# Patient Record
Sex: FEMALE | Race: OTHER | Employment: FULL TIME | URBAN - METROPOLITAN AREA
[De-identification: names, ages, dates, MRNs, and addresses within clinical notes are randomized per-mention and may not be internally consistent; named-entity substitution may affect disease eponyms.]

---

## 2021-06-09 ENCOUNTER — HOSPITAL ENCOUNTER (OUTPATIENT)
Dept: PHYSICAL THERAPY | Age: 46
Discharge: HOME | End: 2021-06-09
Payer: OTHER GOVERNMENT

## 2021-06-09 PROCEDURE — 97110 THERAPEUTIC EXERCISES: CPT | Performed by: PHYSICAL THERAPIST

## 2021-06-09 PROCEDURE — 97014 ELECTRIC STIMULATION THERAPY: CPT | Performed by: PHYSICAL THERAPIST

## 2021-06-09 PROCEDURE — 97162 PT EVAL MOD COMPLEX 30 MIN: CPT | Performed by: PHYSICAL THERAPIST

## 2021-06-09 NOTE — PROGRESS NOTES
[address] [facility] PHYSICAL THERAPY AT [address] [place] [address]  [address], [address] ,[address], [address]  Phone: [phone]  Fax: [phone] / STATEMENT OF MEDICAL NECESSITY FOR PHYSICAL THERAPY SERVICES  Patient Name: Aminah Kyle : 1975   Medical   Diagnosis: Lower back pain [M54.5] Treatment Diagnosis: LBP   Onset Date: Chronic     Referral Source: Ana Alvarez NP Start of Care [facility] [place]): 2021   Prior Hospitalization: See medical history Provider #: [id number]   Prior Level of Function: Pt has had several year h/o limited sitting tolerance and inability to stand still, working as a  at [employer]   Comorbidities: 20lb weight gain over past year due to inactivity from LBP   Medications: Verified on Patient Summary List   The Plan of Care and following information is based on the information from the initial evaluation.   ===========================================================================================  Assessment / key information:  53 y/o female presents to PT with c/o centralized lower back pain for the past several years. She notes symptoms are worse with prolonged sitting and standing (especially standing still). Pt notes that symptoms have worsened over the past few months, and pain has extended into her posterior buttock/upper thoracic area. She had multiple radiographs taken which revealed an anterolisthesis at L5. Examination showed lumbar AROM was painfully limited into extension and rotation R to 75% of normal.  She had good strength in LEs, but did have some LBP with resisted R hip flexion. Palpation was inconclusive due to increased reactivity/guarding by the pt.   Ms Lulu Goode has signs and symptoms suggestive of lumbar spondylolisthesis.     ===========================================================================================  Eval Complexity: History MEDIUM  Complexity : 1-2 comorbidities / personal factors will impact the outcome/ POC ;  Examination  MEDIUM Complexity : 3 Standardized tests and measures addressing body structure, function, activity limitation and / or participation in recreation ; Presentation MEDIUM Complexity : Evolving with changing characteristics ; Decision Making MEDIUM Complexity : FOTO score of 26-74; Overall Complexity MEDIUM  Problem List: pain affecting function, decrease ROM, decrease strength, impaired gait/ balance, decrease ADL/ functional abilitiies, decrease activity tolerance, decrease flexibility/ joint mobility and decrease transfer abilities   Treatment Plan may include any combination of the following: Therapeutic exercise, Therapeutic activities, Neuromuscular re-education, Physical agent/modality, Gait/balance training, Manual therapy, Dry Needling, Aquatic therapy, Patient education, Self Care training, Functional mobility training, Home safety training and Stair training  Patient / Family readiness to learn indicated by: asking questions, trying to perform skills and interest  Persons(s) to be included in education: patient (P)  Barriers to Learning/Limitations: None  Measures taken:    Patient Goal (s): \"relieve some of the pain\"   Patient self reported health status: fair  Rehabilitation Potential: good   Short Term Goals: To be accomplished in  2  weeks:  1. Pt independent with basic HEP for flexion based lumbar flexibility and strengthening. 2. Pt able to adapt sitting posture to allow her to sit for up to 30 minutes without pain or soreness when rising.  Long Term Goals: To be accomplished in  6  weeks:  1. Pt to increase FOTO score from 67 to >/= 73  2. Pt independent with high level HEP for lumbar stabilization, body mechnaics, and hip flexibility. 3. Pt to report ability to drive in car for up to 45 minutes without any back pain. 4. Pt pamella to resume walking with exercise without any pain or residual soreness.   Frequency / Duration:   Patient to be seen 2  times per week for 15  sessions:  Patient / Caregiver education and instruction: self care, activity modification, brace/ splint application and exercises    Therapist Signature: Lindsey Stein PT Date: 5/4/7268   Certification Period: na Time: 8:30 AM   ===========================================================================================  I certify that the above Physical Therapy Services are being furnished while the patient is under my care. I agree with the treatment plan and certify that this therapy is necessary. Physician Signature:        Date:       Time:  _     Doris Ball NP  Please sign and return to [facility] [place] or you may fax the signed copy to [phone]. Thank you.

## 2021-06-09 NOTE — PROGRESS NOTES
PHYSICAL THERAPY - DAILY TREATMENT NOTE    Patient Name: Severiano Copper        Date: 2021  : 1975   YES Patient  Verified  Visit #:   1   of   15  Insurance: Payor: JANINE / Plan: Lev Tyler 73 / Product Type:  /      In time: 4:50 Out time: 5:55   Total Treatment Time: 65     BCBS/Medicare Time Tracking (below)   Total Timed Codes (min):  na 1:1 Treatment Time:  na     TREATMENT AREA =  Lower back pain [M54.5]    SUBJECTIVE  Pain Level (on 0 to 10 scale):  6  / 10   Medication Changes/New allergies or changes in medical history, any new surgeries or procedures?     NO    If yes, update Summary List   Subjective Functional Status/Changes:  [x]  No changes reported     See eval/POC           Modalities Rationale:     decrease pain and increase tissue extensibility to improve patient's ability to prevent soreness  15 min [x] Estim, type/location: IFC to lower back - supine after sesison                                     []  att     [x]  unatt     []  w/US     []  w/ice    [x]  w/heat    min []  Mechanical Traction: type/lbs                   []  pro   []  sup   []  int   []  cont    []  before manual    []  after manual    min []  Ultrasound, settings/location:      min []  Iontophoresis w/ dexamethasone, location:                                               []  take home patch       []  in clinic    min []  Ice     []  Heat    location/position:     min []  Vasopneumatic Device, press/temp:     min []  Other:    [x] Skin assessment post-treatment (if applicable):    [x]  intact    []  redness- no adverse reaction     []redness  adverse reaction:        15 min Therapeutic Exercise:  [x]  See flow sheet   Rationale:      increase ROM, increase strength and improve coordination to improve the patients ability to increase activity tolerance       5 min Therapeutic Activity: [x]  See flow sheet   Rationale:    increase ROM, increase strength and improve coordination to improve the patients ability to increase positional tolerance      Billed With/As:   [] TE   [] TA   [] Neuro   [] Self Care Patient Education: [x] Review HEP    [] Progressed/Changed HEP based on:   [] positioning   [] body mechanics   [] transfers   [] heat/ice application    [] other:      Other Objective/Functional Measures:    FOTO - 67     Post Treatment Pain Level (on 0 to 10) scale:   4  / 10     ASSESSMENT  Assessment/Changes in Function:     Pt has signs and symptoms suggestive of subacute pain related to underlying spondylolisthesis. []  See Progress Note/Recertification   Patient will continue to benefit from skilled PT services to modify and progress therapeutic interventions, address functional mobility deficits, address ROM deficits, address strength deficits, analyze and address soft tissue restrictions, analyze and cue movement patterns, analyze and modify body mechanics/ergonomics, and assess and modify postural abnormalities to attain remaining goals.    Progress toward goals / Updated goals:    Goals established today     PLAN  [x]  Upgrade activities as tolerated YES Continue plan of care   []  Discharge due to :    []  Other:      Therapist: Nazario Barajas PT    Date: 6/11/2021 Time: 8:30 AM     Future Appointments   Date Time Provider Ashish Vásquez   6/15/2021  4:15 PM Cheko Vallejo, PT [facility]   6/22/2021 10:30 AM Precious Macario, PT [facility]   6/23/2021 10:30 AM Precious Macario, PT [facility]   6/24/2021  7:30 AM [id number]   6/29/2021  9:30 AM Uldigna Vallejo, PT [facility]   7/1/2021  5:00 PM Uldigna Vallejo, PT [facility]   7/6/2021  9:45 AM Precious Macario, PT [facility] [place] [facility]   7/8/2021  8:45 AM Cheko Vallejo, PT [facility]   7/12/2021  9:45 AM Precious Macario, PT [facility]   7/14/2021  9:00 AM Nhan Silva, PT [facility]

## 2021-06-15 ENCOUNTER — HOSPITAL ENCOUNTER (OUTPATIENT)
Dept: PHYSICAL THERAPY | Age: 46
Discharge: HOME | End: 2021-06-15
Payer: OTHER GOVERNMENT

## 2021-06-15 PROCEDURE — 97014 ELECTRIC STIMULATION THERAPY: CPT

## 2021-06-15 PROCEDURE — 97110 THERAPEUTIC EXERCISES: CPT

## 2021-06-15 PROCEDURE — 97112 NEUROMUSCULAR REEDUCATION: CPT

## 2021-06-15 NOTE — PROGRESS NOTES
[facility] at [address] [facility] [address] [facility], [address] [place] [address]  Phone [phone]   Fax: [phone]    Physical Therapy Home E-STIM/NMES Unit Recommendation      Patient's name Brisa Shepherd   Referring Physician: JULIETA Cruz NP   Treatment Diagnosis: LBP    YOB: 1975   MRN: [id number]     Onset Date:chronic  Date of Service:6/15/2021    Total Treatments: 2       Patient may benefit from E-STIM/NMES home unit due to pain reduction/benefits with use of E-STIM with physical therapy treatments. If you are in agreement, please sign below. Thank you for this referral.        Thank you,  Therapist: Antonino Louise, PT  Date: 6/15/2021  Time:4:29 PM  _______________________________________________________________________    I certify that the above Therapy Services are being furnished while the patient is under my care. I agree with the treatment plan and certify that this therapy is necessary. Y or N I have read the above and request that my patient continue as recommended. Y or N I have read the above report and request that my patient continue therapy with the following changes/special instructions:______________________________________________________________  Y or N I have read the above report and do not wish to have a home ESTIM unit provided    Physician's Signature:____________________  Date:________________    Please sign and return to [facility] at [address] [facility] [address] [facility], [address] [place] [address] Road  Or fax to [phone].

## 2021-06-15 NOTE — PROGRESS NOTES
PHYSICAL THERAPY - DAILY TREATMENT NOTE    Patient Name: Jake Tomlinson        Date: 6/15/2021  : 1975   YES Patient  Verified  Visit #:   2   of   15  Insurance: Payor: JANINE / Plan: Lev Tyler 73 / Product Type:  /      In time: 4:20 P Out time: 5:20 P   Total Treatment Time: 55     BCBS/Medicare Time Tracking (below)   Total Timed Codes (min):  NA 1:1 Treatment Time:  NA     TREATMENT AREA = Lower back pain [M54.5]    SUBJECTIVE  Pain Level (on 0 to 10 scale):  7  / 10   Medication Changes/New allergies or changes in medical history, any new surgeries or procedures? NO    If yes, update Summary List   Subjective Functional Status/Changes:  []  No changes reported     Patient reports she had a fall this morning, she missed the bottom step & fell down onto her buttocks so she is having more pain & burning into her lower back today. She will have MRI of back next week & f/u with MD to be scheduled soon after.            Modalities Rationale:     decrease pain to improve patient's ability to return to pain-free ADLs  15 min [x] Estim, type/location: IFC to l/s in supine                                      []  att     []  unatt     []  w/US     []  w/ice    []  w/heat    min []  Mechanical Traction: type/lbs                   []  pro   []  sup   []  int   []  cont    []  before manual    []  after manual    min []  Ultrasound, settings/location:      min []  Iontophoresis w/ dexamethasone, location:                                               []  take home patch       []  in clinic    min []  Ice     []  Heat    location/position:     min []  Vasopneumatic Device, press/temp:    If using vaso (only need to measure limb vaso being performed on)      pre-treatment girth :       post-treatment girth :       measured at (landmark location) :      min []  Other:    [] Skin assessment post-treatment (if applicable):    [x]  intact    [x]  redness- no adverse reaction []redness  adverse reaction:        15 min Therapeutic Exercise:  [x]  See flow sheet   Rationale:      increase ROM and increase strength to improve the patients ability to return to pain-free standing     25 min Neuromuscular Re-ed: [x]  See flow sheet   Rationale:    increase ROM, increase strength, improve coordination and increase proprioception to improve the patients ability to return to pain-free bed mobility       Billed With/As:   [] TE   [] TA   [] Neuro   [] Self Care Patient Education: [x] Review HEP    [] Progressed/Changed HEP based on:   [] positioning   [] body mechanics   [] transfers   [] heat/ice application    [] other:      Other Objective/Functional Measures:    Initiated exercise per flow sheet (see updated HEP)     Post Treatment Pain Level (on 0 to 10) scale:   5  / 10     ASSESSMENT  Assessment/Changes in Function:     Good tolerance to initial program, slight increased c/o pain L>R hip flexor stretch     []  See Progress Note/Recertification   Patient will continue to benefit from skilled PT services to modify and progress therapeutic interventions, address functional mobility deficits, address ROM deficits, address strength deficits, analyze and modify body mechanics/ergonomics, assess and modify postural abnormalities, address imbalance/dizziness and instruct in home and community integration to attain remaining goals.    Progress toward goals / Updated goals:    Progressing towards goals established at [address] [facility] [id number]  []  Upgrade activities as tolerated YES Continue plan of care   []  Discharge due to :    []  Other:      Therapist: Sorin Haddad PT    Date: 6/15/2021 Time: 4:27 PM     Future Appointments   Date Time Provider Ashish Vásquez   6/22/2021 10:30 AM [facility]ta Servin PT [facility] [place] [facility]   6/23/2021 10:30 AM Roberta Servin PT [facility]   6/24/2021  7:30 [facility]   6/29/2021  9:30 AM [facility]   7/1/2021  5:00 PM [facility] [place] [facility] [place] [facility]   7/6/2021  9:45 AM Skyler Abdi, PT [facility]   7/8/2021  8:45 AM Misty Reina, PT [facility]   7/12/2021  9:45 AM Skyler Abdi, PT [facility]   7/14/2021  9:00 AM Gato Silva, PT [facility]

## 2021-06-23 ENCOUNTER — HOSPITAL ENCOUNTER (OUTPATIENT)
Dept: PHYSICAL THERAPY | Age: 46
Discharge: HOME | End: 2021-06-23
Payer: OTHER GOVERNMENT

## 2021-06-23 PROCEDURE — 97014 ELECTRIC STIMULATION THERAPY: CPT | Performed by: PHYSICAL THERAPIST

## 2021-06-23 PROCEDURE — 97110 THERAPEUTIC EXERCISES: CPT | Performed by: PHYSICAL THERAPIST

## 2021-06-29 ENCOUNTER — APPOINTMENT (OUTPATIENT)
Dept: PHYSICAL THERAPY | Age: 46
End: 2021-06-29
Payer: OTHER GOVERNMENT

## 2021-07-01 ENCOUNTER — HOSPITAL ENCOUNTER (OUTPATIENT)
Dept: PHYSICAL THERAPY | Age: 46
Discharge: HOME | End: 2021-07-01
Payer: OTHER GOVERNMENT

## 2021-07-01 PROCEDURE — 97530 THERAPEUTIC ACTIVITIES: CPT

## 2021-07-01 PROCEDURE — 97110 THERAPEUTIC EXERCISES: CPT

## 2021-07-01 PROCEDURE — 97014 ELECTRIC STIMULATION THERAPY: CPT

## 2021-07-01 NOTE — PROGRESS NOTES
PHYSICAL THERAPY - DAILY TREATMENT NOTE    Patient Name: Tamanna Skelton        Date: 2021  : 1975   YES Patient  Verified  Visit #:      12  Insurance: Payor: JANINE / Plan: Lev Tyler 73 / Product Type:  /      In time: 5:00 P Out time: 5:55 P   Total Treatment Time: 50     BCBS/Medicare Time Tracking (below)   Total Timed Codes (min):  NA 1:1 Treatment Time:  NA     TREATMENT AREA = Lower back pain [M54.5]    SUBJECTIVE  Pain Level (on 0 to 10 scale):  3-4  / 10   Medication Changes/New allergies or changes in medical history, any new surgeries or procedures? NO    If yes, update Summary List   Subjective Functional Status/Changes:  []  No changes reported     Patient reports she had an MRI last week & f/u with NP but they did not have her results of her MRI, today was her last day with Evan Greenberg NP who will no longer be at this office & she will see her PCP, Dr. Adriana Lala.          Modalities Rationale:     decrease pain to improve patient's ability to return to pain-free ADLs   15 min [x] Estim, type/location: IFC to l/s in supine                                     []  att     [x]  unatt     []  w/US     []  w/ice    [x]  w/heat    min []  Mechanical Traction: type/lbs                   []  pro   []  sup   []  int   []  cont    []  before manual    []  after manual    min []  Ultrasound, settings/location:      min []  Iontophoresis w/ dexamethasone, location:                                               []  take home patch       []  in clinic    min []  Ice     []  Heat    location/position:     min []  Vasopneumatic Device, press/temp:    If using vaso (only need to measure limb vaso being performed on)      pre-treatment girth :       post-treatment girth :       measured at (landmark location) :      min []  Other:    [] Skin assessment post-treatment (if applicable):    [x]  intact    [x]  redness- no adverse reaction                  []redness  adverse reaction:        20 min Therapeutic Exercise:  [x]  See flow sheet   Rationale:      increase ROM and increase strength to improve the patients ability to return to pain-free standing     15 min Therapeutic Activity: [x]  See flow sheet   Rationale:    increase ROM, increase strength, improve balance and increase proprioception to improve the patients ability to return to pain-free rolling/bed mobility as well as lifting      Billed With/As:   [] TE   [] TA   [] Neuro   [] Self Care Patient Education: [x] Review HEP    [] Progressed/Changed HEP based on:   [] positioning   [] body mechanics   [] transfers   [] heat/ice application    [] other:      Other Objective/Functional Measures:    TA: demonstrated sleeping posture in L S/L with use of pillows, reviewed sitting posture with use of l/s roll & lifting mechanics for housework at home     Post Treatment Pain Level (on 0 to 10) scale:   3  / 10     ASSESSMENT  Assessment/Changes in Function:     No increase in pain with today's treatment, decreased reported pain levels with positioning in S/L with 2 pillows for support, improved tolerance to laying in L S/L vs R     []  See Progress Note/Recertification   Patient will continue to benefit from skilled PT services to modify and progress therapeutic interventions, address functional mobility deficits, address ROM deficits, address strength deficits, analyze and modify body mechanics/ergonomics, assess and modify postural abnormalities and instruct in home and community integration to attain remaining goals.    Progress toward goals / Updated goals:    Progressing towards STG 2 & LTG 1     PLAN  []  Upgrade activities as tolerated YES Continue plan of care   []  Discharge due to :    []  Other:      Therapist: Devang Sotelo PT    Date: 7/1/2021 Time: 5:08 PM     Future Appointments   Date Time Provider sAhish Vásquez   7/6/2021  9:45 AM Edwin Jimenez, PT [place] [facility] [place] [facility]   7/8/2021  8:45 AM Ethan Klever [place] [facility] [place] [facility]   7/12/2021  9:45 AM Rainer Escamilla, PT [facility]   7/14/2021  9:00 AM Jonatan Silva, PT [facility]

## 2021-07-06 ENCOUNTER — HOSPITAL ENCOUNTER (OUTPATIENT)
Dept: PHYSICAL THERAPY | Age: 46
Discharge: HOME | End: 2021-07-06
Payer: OTHER GOVERNMENT

## 2021-07-06 PROCEDURE — 97014 ELECTRIC STIMULATION THERAPY: CPT | Performed by: PHYSICAL THERAPIST

## 2021-07-06 PROCEDURE — 97110 THERAPEUTIC EXERCISES: CPT | Performed by: PHYSICAL THERAPIST

## 2021-07-06 NOTE — PROGRESS NOTES
PHYSICAL THERAPY - DAILY TREATMENT NOTE    Patient Name: David Alcantara        Date: 2021  : 1975   YES Patient  Verified  Visit #:   5   of   15  Insurance: Payor: JANINE / Plan: Lev Tyler 73 / Product Type:  /      In time: 9:45 Out time: 10:45   Total Treatment Time: 60     BCBS/Medicare Time Tracking (below)   Total Timed Codes (min):  NA 1:1 Treatment Time:  NA     TREATMENT AREA = Lower back pain [M54.5]    SUBJECTIVE  Pain Level (on 0 to 10 scale):  4  / 10   Medication Changes/New allergies or changes in medical history, any new surgeries or procedures?     NO    If yes, update Summary List   Subjective Functional Status/Changes:  []  No changes reported     Pt has MRI results (see chart), which confirm bilateral L5 pars defects and mild L forminal stenosis at L 4/5, L5-S1       Modalities Rationale:     decrease pain to improve patient's ability to return to pain-free ADLs  15 min [x] Estim, type/location: IFC to l/s in supine                                      []  att     []  unatt     []  w/US     []  w/ice    []  w/heat    min []  Mechanical Traction: type/lbs                   []  pro   []  sup   []  int   []  cont    []  before manual    []  after manual    min []  Ultrasound, settings/location:      min []  Iontophoresis w/ dexamethasone, location:                                               []  take home patch       []  in clinic    min []  Ice     []  Heat    location/position:     min []  Vasopneumatic Device, press/temp:    If using vaso (only need to measure limb vaso being performed on)      pre-treatment girth :       post-treatment girth :       measured at (landmark location) :      min []  Other:    [] Skin assessment post-treatment (if applicable):    [x]  intact    [x]  redness- no adverse reaction                  []redness  adverse reaction:        45 min Therapeutic Exercise:  [x]  See flow sheet   Rationale:      increase ROM and increase strength to improve the patients ability to return to pain-free standing       Billed With/As:   [] TE   [] TA   [] Neuro   [] Self Care Patient Education: [x] Review HEP    [] Progressed/Changed HEP based on:   [] positioning   [] body mechanics   [] transfers   [] heat/ice application    [] other:      Other Objective/Functional Measures:    Pt presented with MRI report and she was educated on definitions and findings related to prior radiographic results (reported added to chart)    Practiced golfer's lift and was pamella to show good form with minimal lumbar stress. Post Treatment Pain Level (on 0 to 10) scale:   3  / 10     ASSESSMENT  Assessment/Changes in Function:     Pt educated on sidelying positioning for opening L lumbar foramen and managing L foot symptoms. She was also educated on importance of neutral spine postures while standing, doing housework. She was shown staggered stance and using one foot on elevated surface to manage sagittal plane stresses. []  See Progress Note/Recertification   Patient will continue to benefit from skilled PT services to modify and progress therapeutic interventions, address functional mobility deficits, address ROM deficits, address strength deficits, analyze and modify body mechanics/ergonomics, assess and modify postural abnormalities, address imbalance/dizziness and instruct in home and community integration to attain remaining goals.    Progress toward goals / Updated goals:    Making slow, gradual gains in functional activity tolerance     PLAN  [x]  Upgrade activities as tolerated YES Continue plan of care   []  Discharge due to :    []  Other:      Therapist: Ebony Woodruff PT    Date: 7/6/2021 Time: 4:27 PM     Future Appointments   Date Time Provider Ashish Vásquez   7/6/2021  9:45 AM Yi Rios PT [facility] [place] [facility]   7/8/2021  8:45 AM Lyle Fritz PT [facility]   7/12/2021  9:45 AM Yi Rios PT [facility]   7/14/2021  9:00 AM Rozina Dominguez W, PT [facility]

## 2021-07-08 ENCOUNTER — HOSPITAL ENCOUNTER (OUTPATIENT)
Dept: PHYSICAL THERAPY | Age: 46
Discharge: HOME | End: 2021-07-08
Payer: OTHER GOVERNMENT

## 2021-07-08 PROCEDURE — 97110 THERAPEUTIC EXERCISES: CPT

## 2021-07-08 PROCEDURE — 97530 THERAPEUTIC ACTIVITIES: CPT

## 2021-07-08 PROCEDURE — 97014 ELECTRIC STIMULATION THERAPY: CPT

## 2021-07-08 NOTE — PROGRESS NOTES
PHYSICAL THERAPY - DAILY TREATMENT NOTE    Patient Name: Clinton Ash        Date: 2021  : 1975   YES Patient  Verified  Visit #:     Insurance: Payor: JANINE / Plan: Lev Tyler 73 / Product Type:  /      In time: 8:50 A Out time: 10:05 A   Total Treatment Time: 65     BCBS/Medicare Time Tracking (below)   Total Timed Codes (min):  NA 1:1 Treatment Time:  NA     TREATMENT AREA =  Lower back pain [M54.5]  SUBJECTIVE  Pain Level (on 0 to 10 scale):  3  / 10   Medication Changes/New allergies or changes in medical history, any new surgeries or procedures?     NO    If yes, update Summary List   Subjective Functional Status/Changes:  []  No changes reported     See progress note to MD            Modalities Rationale:     decrease pain to improve patient's ability to return to pain-free ADLs   15 min [x] Estim, type/location: IFC to l/s in supine                                      []  att     [x]  unatt     []  w/US     []  w/ice    [x]  w/heat    min []  Mechanical Traction: type/lbs                   []  pro   []  sup   []  int   []  cont    []  before manual    []  after manual    min []  Ultrasound, settings/location:      min []  Iontophoresis w/ dexamethasone, location:                                               []  take home patch       []  in clinic    min []  Ice     []  Heat    location/position:     min []  Vasopneumatic Device, press/temp:    If using vaso (only need to measure limb vaso being performed on)      pre-treatment girth :       post-treatment girth :       measured at (landmark location) :      min []  Other:    [] Skin assessment post-treatment (if applicable):    []  intact    []  redness- no adverse reaction                  []redness  adverse reaction:        25 min Therapeutic Exercise:  [x]  See flow sheet   Rationale:      increase ROM and increase strength to improve the patients ability to return to pain-free walking program     15 min Therapeutic Activity: [x]  See flow sheet   Rationale:    improve coordination and increase proprioception to improve the patients ability to return to pain-free transfers      Billed With/As:   [] TE   [] TA   [] Neuro   [] Self Care Patient Education: [x] Review HEP    [] Progressed/Changed HEP based on:   [] positioning   [] body mechanics   [] transfers   [] heat/ice application    [] other:      Other Objective/Functional Measures:    TA: reviewed sitting posture, positioning with bed mobility at night (reviewed use of pillow bought on [employer] for S/L positioning), dicussed current home program I.e. avoiding high impact activities such as running given pars defect      Post Treatment Pain Level (on 0 to 10) scale:   3  / 10     ASSESSMENT  Assessment/Changes in Function:     Steady progress with PT, slow progress with pain reduction    []  See Progress Note/Recertification   Patient will continue to benefit from skilled PT services to modify and progress therapeutic interventions, address functional mobility deficits, address ROM deficits, address strength deficits, analyze and address soft tissue restrictions, analyze and cue movement patterns, analyze and modify body mechanics/ergonomics, assess and modify postural abnormalities, address imbalance/dizziness and instruct in home and community integration to attain remaining goals.    Progress toward goals / Updated goals:    Progressing towards STG 2 & STG 1 & LTG 4     PLAN  []  Upgrade activities as tolerated YES Continue plan of care   []  Discharge due to :    []  Other:      Therapist: Emma Gallegos, PT    Date: 7/8/2021 Time: 9:11 AM     Future Appointments   Date Time Provider Ashish Vásquez   7/12/2021  9:45 AM Pedro De La Rosa, PT [facility]   7/14/2021  9:00 AM Elisa Silva, PT [facility]

## 2021-07-08 NOTE — PROGRESS NOTES
[facility] PHYSICAL THERAPY AT [address] [place] [address]  [facility], [address] [place] [address], [address]  Phone: [phone]  Fax: [phone]  PROGRESS NOTE  Patient Name: Vlad Lawson : 1975   Treatment/Medical Diagnosis: Lower back pain [M54.5]   Referral Source: SEUN Benz MD      Date of Initial Visit: 21 Attended Visits: 6 Missed Visits: 0     SUMMARY OF TREATMENT  Therapeutic exercise including ROM, stretching, gentle strengthening, stabilization training, postural ed, patient education, HEP instruction, ESTIM with P. Dannial Gentleman  Mrs. Nic Mahmood continues to report constant c/o LBP, average pain level at 3-4/10, 7/10 at worst, 1/10 at best.  Her pain worsens with prolonged sitting, static standing, first thing in the morning & at times wakes her from sleeping. Her symptoms are improved with avoiding static postures in general & taking naproxen & advil prn. Most recent MRI results revealed mild foraminal narrowing as well as bilateral L5 pars defects, patient has been educated on modification of current home program/recreational fitness program. Please see below for other improvements with PT. Goal/Measure of Progress Goal Met? 1.  Pt independent with basic HEP for flexion based lumbar flexibility and strengthening. Status at last Eval: NA Current Status: HEP established yes   2. Pt able to adapt sitting posture to allow her to sit for up to 30 minutes without pain or soreness when rising. Status at last Eval: Limited by pain Current Status: 5-10 minutes sitting tolerance progressing   3. Pt able to resume walking with exercise without any pain or residual soreness. Status at last Eval: NA Current Status: Patient is walking ~ 30 mins ~ 6 days/week with manageable sx yes     New Goals to be achieved in __3-4__  weeks:  1. Pt to increase FOTO score from 67 to >/= 73   2.   Pt able to adapt sitting posture to allow her to sit for up to 30 minutes without pain or soreness when rising. 3.  Pt independent with high level HEP for lumbar stabilization, body mechnaics, and hip flexibility. 4.  Decrease pain level to < or = 5/10 at work with home program to improve tolerance to ADLs. RECOMMENDATIONS  Patient to continue with PT for up to 3-4 more weeks prn in order to progress towards achieving all LTGs. If you have any questions/comments please contact us directly at [phone]. Thank you for allowing us to assist in the care of your patient. Therapist Signature: CON Reilly, cert MDT Date: 6/6/4197     Time: 9:13 AM   NOTE TO PHYSICIAN:  PLEASE COMPLETE THE ORDERS BELOW AND FAX TO   [facility] Physical Therapy: [phone]. If you are unable to process this request in 24 hours please contact our office: [phone].    ___ I have read the above report and request that my patient continue as recommended.   ___ I have read the above report and request that my patient continue therapy with the following changes/special instructions:_________________________________________________________   ___ I have read the above report and request that my patient be discharged from therapy.      Physician Signature:                                                             Date:                                     Time:                                                                       James Medina NP

## 2021-08-10 ENCOUNTER — HOSPITAL ENCOUNTER (OUTPATIENT)
Dept: PHYSICAL THERAPY | Age: 46
Discharge: HOME | End: 2021-08-10
Payer: OTHER GOVERNMENT

## 2021-08-10 PROCEDURE — 97110 THERAPEUTIC EXERCISES: CPT | Performed by: PHYSICAL THERAPIST

## 2021-08-10 PROCEDURE — 97014 ELECTRIC STIMULATION THERAPY: CPT | Performed by: PHYSICAL THERAPIST

## 2021-08-10 PROCEDURE — 97530 THERAPEUTIC ACTIVITIES: CPT | Performed by: PHYSICAL THERAPIST

## 2021-08-10 NOTE — PROGRESS NOTES
[address] [facility] PHYSICAL THERAPY  [facility]  [address], [address] ,[address], [address]  Phone: [phone]  Fax: [phone]  PROGRESS NOTE  Patient Name: Cara Mccormick : 1975   Treatment/Medical Diagnosis: Lower back pain [M54.5]   Referral Source: SEUN Lind MD      Date of Initial Visit: 21 Attended Visits: 7 Missed Visits: 1     SUMMARY OF TREATMENT  Therapeutic exercise including ROM, stretching, gentle strengthening, stabilization training, postural ed, patient education, HEP instruction, ESTIM with [facility]. CURRENT STATUS  Ms. Amauri Mcneal returned to PT on 8/10/21, and had not been seen since last progress report on 21. She reports being out of town on vacation, and saw PCP upon return home. She reports being diagnosed with fibromyalgia, and started taking Lyrica. She has noticed improved sleep and less stiffness in her hands/wrists when awakening in the morning. She notes that her lower back pain has been unchanged. Pt rates her pain as 8/10, but notes symptoms fluctuate without an identifiable pattern. Discussion of continuing PT to manage stresses on lumbar spine and emphasize neutral spine posture. Will resume PT working toward prior established goals. Goals to be achieved in __3-4__  weeks:  1. Pt to increase FOTO score from 67 to >/= 73   2. Pt able to adapt sitting posture to allow her to sit for up to 30 minutes without pain or soreness when rising. 3.  Pt independent with high level HEP for lumbar stabilization, body mechnaics, and hip flexibility. 4.  Decrease pain level to < or = 5/10 at work with home program to improve tolerance to ADLs. RECOMMENDATIONS  Patient to continue with PT for up to 3-4 more weeks prn in order to progress towards achieving all LTGs. If you have any questions/comments please contact us directly at [phone].    Thank you for allowing us to assist in the care of your patient. Therapist Signature: Madison Maynard PT, OCS, MTC Date: 8/10/2021     Time: 9:13 AM   NOTE TO PHYSICIAN:  PLEASE COMPLETE THE ORDERS BELOW AND FAX TO   [facility] Physical Therapy: [phone]. If you are unable to process this request in 24 hours please contact our office: [phone].    ___ I have read the above report and request that my patient continue as recommended.   ___ I have read the above report and request that my patient continue therapy with the following changes/special instructions:_________________________________________________________   ___ I have read the above report and request that my patient be discharged from therapy.      Physician Signature:                                                             Date:                                     Time:                                                                       Sherlyn Mary NP

## 2021-08-10 NOTE — PROGRESS NOTES
PHYSICAL THERAPY - DAILY TREATMENT NOTE    Patient Name: June Loja        Date: 8/10/2021  : 1975   YES Patient  Verified  Visit #:      15  Insurance: Payor:  / Plan: Martín Shear / Product Type:  /      In time: 12:00 Out time: 12:55   Total Treatment Time: 55     BCBS/Medicare Time Tracking (below)   Total Timed Codes (min):  NA 1:1 Treatment Time:  NA     TREATMENT AREA = Lower back pain [M54.5]    SUBJECTIVE  Pain Level (on 0 to 10 scale):  8  / 10   Medication Changes/New allergies or changes in medical history, any new surgeries or procedures? YES    If yes, update Summary List   Subjective Functional Status/Changes:  []  No changes reported     Pt reports being on vacation, then seeing Dr Zelalem Saunders last week. She was diagnosed with fibromyalgia and was given Lyrica BID.        Modalities Rationale:     decrease pain to improve patient's ability to return to pain-free ADLs  15 min [x] Estim, type/location: IFC to Low Back in supine                                      []  att     []  unatt     []  w/US     []  w/ice    [x]  w/heat    min []  Mechanical Traction: type/lbs                   []  pro   []  sup   []  int   []  cont    []  before manual    []  after manual    min []  Ultrasound, settings/location:      min []  Iontophoresis w/ dexamethasone, location:                                               []  take home patch       []  in clinic    min []  Ice     []  Heat    location/position:     min []  Vasopneumatic Device, press/temp:    If using vaso (only need to measure limb vaso being performed on)      pre-treatment girth :       post-treatment girth :       measured at (landmark location) :      min []  Other:    [] Skin assessment post-treatment (if applicable):    [x]  intact    [x]  redness- no adverse reaction                  []redness  adverse reaction:        40 min Therapeutic Exercise:  [x]  See flow sheet   Rationale:      increase ROM and increase strength to improve the patients ability to return to pain-free standing       Billed With/As:   [] TE   [] TA   [] Neuro   [] Self Care Patient Education: [x] Review HEP    [] Progressed/Changed HEP based on:   [] positioning   [] body mechanics   [] transfers   [] heat/ice application    [] other:      Other Objective/Functional Measures:    Pt educated on importance of neutral spine postures when sitting/standing/exercising. She was restarted on basic core stabilization and did well except unable to push with L UE into ball for isometric obliques     Post Treatment Pain Level (on 0 to 10) scale:   6  / 10     ASSESSMENT  Assessment/Changes in Function:     Pt reported some numbness into R foot after doing a set of hooklying TA Draw with LE march. She was encouraged to bring in her lumbar corset next session. []  See Progress Note/Recertification   Patient will continue to benefit from skilled PT services to modify and progress therapeutic interventions, address functional mobility deficits, address ROM deficits, address strength deficits, analyze and modify body mechanics/ergonomics, assess and modify postural abnormalities, address imbalance/dizziness and instruct in home and community integration to attain remaining goals. Progress toward goals / Updated goals:    Good tolerance to re-introduction of basic core exercises.      PLAN  [x]  Upgrade activities as tolerated YES Continue plan of care   []  Discharge due to :    []  Other:      Therapist: Carlus Scheuermann, PT    Date: 8/10/2021 Time: 4:27 PM     Future Appointments   Date Time Provider Ashish Vásquez   8/17/2021 11:00 AM Analilia Carpio PT [facility]   8/18/2021  9:45 AM [facility]

## 2021-08-17 ENCOUNTER — APPOINTMENT (OUTPATIENT)
Dept: PHYSICAL THERAPY | Age: 46
End: 2021-08-17
Payer: OTHER GOVERNMENT

## 2021-08-18 ENCOUNTER — APPOINTMENT (OUTPATIENT)
Dept: PHYSICAL THERAPY | Age: 46
End: 2021-08-18
Payer: OTHER GOVERNMENT

## 2024-09-19 ENCOUNTER — TELEPHONE (OUTPATIENT)
Dept: FAMILY MEDICINE CLINIC | Facility: CLINIC | Age: 49
End: 2024-09-19

## 2024-10-27 SDOH — HEALTH STABILITY: PHYSICAL HEALTH: ON AVERAGE, HOW MANY MINUTES DO YOU ENGAGE IN EXERCISE AT THIS LEVEL?: 60 MIN

## 2024-10-27 SDOH — HEALTH STABILITY: PHYSICAL HEALTH: ON AVERAGE, HOW MANY DAYS PER WEEK DO YOU ENGAGE IN MODERATE TO STRENUOUS EXERCISE (LIKE A BRISK WALK)?: 4 DAYS

## 2024-10-28 ENCOUNTER — HOSPITAL ENCOUNTER (OUTPATIENT)
Facility: HOSPITAL | Age: 49
Setting detail: SPECIMEN
Discharge: HOME | End: 2024-10-31
Payer: OTHER GOVERNMENT

## 2024-10-28 ENCOUNTER — OFFICE VISIT (OUTPATIENT)
Dept: FAMILY MEDICINE CLINIC | Facility: CLINIC | Age: 49
End: 2024-10-28
Payer: OTHER GOVERNMENT

## 2024-10-28 VITALS
SYSTOLIC BLOOD PRESSURE: 132 MMHG | TEMPERATURE: 97.3 F | OXYGEN SATURATION: 99 % | BODY MASS INDEX: 31.28 KG/M2 | HEIGHT: 62 IN | HEART RATE: 58 BPM | DIASTOLIC BLOOD PRESSURE: 84 MMHG | RESPIRATION RATE: 16 BRPM | WEIGHT: 170 LBS

## 2024-10-28 DIAGNOSIS — Z76.89 ESTABLISHING CARE WITH NEW DOCTOR, ENCOUNTER FOR: ICD-10-CM

## 2024-10-28 DIAGNOSIS — M46.90 INFLAMMATORY SPONDYLOPATHY, UNSPECIFIED SPINAL REGION (HCC): ICD-10-CM

## 2024-10-28 DIAGNOSIS — Z00.00 ANNUAL PHYSICAL EXAM: ICD-10-CM

## 2024-10-28 DIAGNOSIS — Z76.89 ESTABLISHING CARE WITH NEW DOCTOR, ENCOUNTER FOR: Primary | ICD-10-CM

## 2024-10-28 DIAGNOSIS — R76.8 POSITIVE ANA (ANTINUCLEAR ANTIBODY): ICD-10-CM

## 2024-10-28 LAB
ANION GAP SERPL CALC-SCNC: 8 MMOL/L (ref 3–18)
BUN SERPL-MCNC: 15 MG/DL (ref 7–18)
BUN/CREAT SERPL: 24 (ref 12–20)
CALCIUM SERPL-MCNC: 9.9 MG/DL (ref 8.5–10.1)
CHLORIDE SERPL-SCNC: 105 MMOL/L (ref 100–111)
CHOLEST SERPL-MCNC: 211 MG/DL
CO2 SERPL-SCNC: 28 MMOL/L (ref 21–32)
CREAT SERPL-MCNC: 0.63 MG/DL (ref 0.6–1.3)
ERYTHROCYTE [DISTWIDTH] IN BLOOD BY AUTOMATED COUNT: 13.2 % (ref 11.6–14.5)
GLUCOSE SERPL-MCNC: 72 MG/DL (ref 74–99)
HCT VFR BLD AUTO: 40.9 % (ref 35–45)
HDLC SERPL-MCNC: 74 MG/DL (ref 40–60)
HDLC SERPL: 2.9 (ref 0–5)
HGB BLD-MCNC: 12.9 G/DL (ref 12–16)
LDLC SERPL CALC-MCNC: 121.4 MG/DL (ref 0–100)
LIPID PANEL: ABNORMAL
MCH RBC QN AUTO: 29.3 PG (ref 24–34)
MCHC RBC AUTO-ENTMCNC: 31.5 G/DL (ref 31–37)
MCV RBC AUTO: 93 FL (ref 78–100)
NRBC # BLD: 0 K/UL (ref 0–0.01)
NRBC BLD-RTO: 0 PER 100 WBC
PLATELET # BLD AUTO: 379 K/UL (ref 135–420)
PMV BLD AUTO: 11.1 FL (ref 9.2–11.8)
POTASSIUM SERPL-SCNC: 4.6 MMOL/L (ref 3.5–5.5)
RBC # BLD AUTO: 4.4 M/UL (ref 4.2–5.3)
SODIUM SERPL-SCNC: 141 MMOL/L (ref 136–145)
T4 FREE SERPL-MCNC: 1 NG/DL (ref 0.7–1.5)
TRIGL SERPL-MCNC: 78 MG/DL
TSH SERPL DL<=0.05 MIU/L-ACNC: 1.22 UIU/ML (ref 0.36–3.74)
VLDLC SERPL CALC-MCNC: 15.6 MG/DL
WBC # BLD AUTO: 9.4 K/UL (ref 4.6–13.2)

## 2024-10-28 PROCEDURE — 84443 ASSAY THYROID STIM HORMONE: CPT

## 2024-10-28 PROCEDURE — 36415 COLL VENOUS BLD VENIPUNCTURE: CPT

## 2024-10-28 PROCEDURE — 85027 COMPLETE CBC AUTOMATED: CPT

## 2024-10-28 PROCEDURE — 80048 BASIC METABOLIC PNL TOTAL CA: CPT

## 2024-10-28 PROCEDURE — 99386 PREV VISIT NEW AGE 40-64: CPT | Performed by: STUDENT IN AN ORGANIZED HEALTH CARE EDUCATION/TRAINING PROGRAM

## 2024-10-28 PROCEDURE — 80061 LIPID PANEL: CPT

## 2024-10-28 PROCEDURE — 90472 IMMUNIZATION ADMIN EACH ADD: CPT | Performed by: STUDENT IN AN ORGANIZED HEALTH CARE EDUCATION/TRAINING PROGRAM

## 2024-10-28 PROCEDURE — 87389 HIV-1 AG W/HIV-1&-2 AB AG IA: CPT

## 2024-10-28 PROCEDURE — 86803 HEPATITIS C AB TEST: CPT

## 2024-10-28 PROCEDURE — 90661 CCIIV3 VAC ABX FR 0.5 ML IM: CPT | Performed by: STUDENT IN AN ORGANIZED HEALTH CARE EDUCATION/TRAINING PROGRAM

## 2024-10-28 PROCEDURE — 90750 HZV VACC RECOMBINANT IM: CPT | Performed by: STUDENT IN AN ORGANIZED HEALTH CARE EDUCATION/TRAINING PROGRAM

## 2024-10-28 PROCEDURE — 84439 ASSAY OF FREE THYROXINE: CPT

## 2024-10-28 PROCEDURE — 90471 IMMUNIZATION ADMIN: CPT | Performed by: STUDENT IN AN ORGANIZED HEALTH CARE EDUCATION/TRAINING PROGRAM

## 2024-10-28 RX ORDER — LINACLOTIDE 290 UG/1
290 CAPSULE, GELATIN COATED ORAL
COMMUNITY
Start: 2024-10-27

## 2024-10-28 SDOH — ECONOMIC STABILITY: FOOD INSECURITY: WITHIN THE PAST 12 MONTHS, YOU WORRIED THAT YOUR FOOD WOULD RUN OUT BEFORE YOU GOT MONEY TO BUY MORE.: NEVER TRUE

## 2024-10-28 SDOH — ECONOMIC STABILITY: FOOD INSECURITY: WITHIN THE PAST 12 MONTHS, THE FOOD YOU BOUGHT JUST DIDN'T LAST AND YOU DIDN'T HAVE MONEY TO GET MORE.: NEVER TRUE

## 2024-10-28 SDOH — ECONOMIC STABILITY: INCOME INSECURITY: HOW HARD IS IT FOR YOU TO PAY FOR THE VERY BASICS LIKE FOOD, HOUSING, MEDICAL CARE, AND HEATING?: NOT HARD AT ALL

## 2024-10-28 ASSESSMENT — ENCOUNTER SYMPTOMS
CHEST TIGHTNESS: 0
SINUS PAIN: 0
SHORTNESS OF BREATH: 0

## 2024-10-28 ASSESSMENT — PATIENT HEALTH QUESTIONNAIRE - PHQ9
SUM OF ALL RESPONSES TO PHQ QUESTIONS 1-9: 0
1. LITTLE INTEREST OR PLEASURE IN DOING THINGS: NOT AT ALL
SUM OF ALL RESPONSES TO PHQ QUESTIONS 1-9: 0
SUM OF ALL RESPONSES TO PHQ9 QUESTIONS 1 & 2: 0
SUM OF ALL RESPONSES TO PHQ QUESTIONS 1-9: 0
SUM OF ALL RESPONSES TO PHQ QUESTIONS 1-9: 0
2. FEELING DOWN, DEPRESSED OR HOPELESS: NOT AT ALL
DEPRESSION UNABLE TO ASSESS: FUNCTIONAL CAPACITY MOTIVATION LIMITS ACCURACY

## 2024-10-28 NOTE — PROGRESS NOTES
[facility]    HISTORY OF PRESENT ILLNESS  Elizabeth Núñez  is a 49 y.o. y.o. female here to establish care.    IBS  -taking linzess, omeprazole  -[facility]    Inflammatory spondylopathy  -used to follow Dr. Erik Lama, but he retired  -went to see another rheumatologist who said she did not have a rheumatological disorder but had fibromyalgia  -hx of positive direct STEPHANIE tests  -used to take humira injections, but had a rash   -then took cellgens   -still endorses symptoms of multiple joint pains    Brain fog  -no recent increase in stress  -feels like she can read well or concentrate because of it    Health Maintenance:    Cervical Cancer Screen/PAP: hysterectomy   Breast Cancer Screen/Mammogram: [facility]  HCV Screen: No results found for: \"HEPCAB\"   DEXA:   No results found for this or any previous visit from the past 3650 days.     Colon Cancer Screenin/24, repeat in 10 yo,     Smoking Status:   Tobacco Use      Smoking status: Never      Smokeless tobacco: Not on file     Lung Cancer Screening:  CT Low Dose n/a     Immunizations:  Flu vaccine- Recommended every fall  COVID vaccine primary series- complete  Tetanus- Tdap   Shingrix- series not completed  RSV-not recommended  Pneumovax 23-  N/A  Prevnar 20- at age 65       Mr#: [id number]      Past Medical History:   Diagnosis Date    Chronic back pain     DDD (degenerative disc disease)     Fibromyalgia     GERD (gastroesophageal reflux disease)     Headache     Obesity        Past Surgical History:   Procedure Laterality Date    JOINT REPLACEMENT      L5 S1 ALIF    PARTIAL HYSTERECTOMY (CERVIX NOT REMOVED)      UPPER GASTROINTESTINAL ENDOSCOPY         Family History   Problem Relation Age of Onset    Diabetes Maternal Uncle     Stroke Maternal Grandmother     Cancer Maternal Grandfather     Cancer Paternal Grandmother     Breast Cancer Paternal Aunt     Stroke Maternal Uncle     Diabetes Father

## 2024-10-28 NOTE — PROGRESS NOTES
Immunizations Administered       Name Date Dose Route    Influenza, FLUCELVAX, (age 6 mo+) IM, Trivalent PF, 0.5mL 10/28/2024 0.5 mL Intramuscular    Site: Deltoid- Left    Lot: [id number]    NDC: 56259-473-16    Zoster Recombinant (Shingrix) 10/28/2024 0.5 mL Intramuscular    Site: Deltoid- Left    Lot: [id number]    NDC: 89749-141-51

## 2024-10-28 NOTE — PROGRESS NOTES
Elizabeth Núñez is a 49 y.o. female (: 1975) presenting to address:    Chief Complaint   Patient presents with    New Patient       Vitals:    10/28/24 1003   BP: 132/84   Pulse:    Resp:    Temp:    SpO2:        \"Have you been to the ER, urgent care clinic since your last visit?  Hospitalized since your last visit?\"    NO    “Have you seen or consulted any other health care providers outside of [facility] since your last visit?”    Yes, ENT appointment coming   Podiatry every 6 weeks  GI every 6 months    “Have you had a colorectal cancer screening such as a colonoscopy/FIT/Cologuard?    YES - Type: Colonoscopy - Where:  [facility] Nurse/CMA to request most recent records if not in the chart     No colonoscopy on file  No cologuard on file  No FIT/FOBT on file   No flexible sigmoidoscopy on file        Have you had a mammogram?”   YES - Where:  [facility]  Nurse/CMA to request most recent records if not in the chart    No breast cancer screening on file      “Have you had a pap smear?”    NO 4-5 years ago  Hysterectomy no need for a PAP    No cervical cancer screening on file

## 2024-10-29 LAB
HIV 1+2 AB+HIV1 P24 AG SERPL QL IA: NONREACTIVE
HIV 1/2 RESULT COMMENT: NORMAL

## 2024-10-30 LAB
HCV AB SERPL QL IA: NORMAL
HCV IGG SERPL QL IA: NON REACTIVE S/CO RATIO

## 2025-01-29 ENCOUNTER — OFFICE VISIT (OUTPATIENT)
Dept: FAMILY MEDICINE CLINIC | Facility: CLINIC | Age: 50
End: 2025-01-29
Payer: OTHER GOVERNMENT

## 2025-01-29 DIAGNOSIS — Z23 NEED FOR VACCINATION: Primary | ICD-10-CM

## 2025-01-29 PROCEDURE — 90750 HZV VACC RECOMBINANT IM: CPT | Performed by: STUDENT IN AN ORGANIZED HEALTH CARE EDUCATION/TRAINING PROGRAM

## 2025-01-29 PROCEDURE — 90471 IMMUNIZATION ADMIN: CPT | Performed by: STUDENT IN AN ORGANIZED HEALTH CARE EDUCATION/TRAINING PROGRAM

## 2025-01-29 SDOH — ECONOMIC STABILITY: FOOD INSECURITY: WITHIN THE PAST 12 MONTHS, YOU WORRIED THAT YOUR FOOD WOULD RUN OUT BEFORE YOU GOT MONEY TO BUY MORE.: NEVER TRUE

## 2025-01-29 SDOH — ECONOMIC STABILITY: FOOD INSECURITY: WITHIN THE PAST 12 MONTHS, THE FOOD YOU BOUGHT JUST DIDN'T LAST AND YOU DIDN'T HAVE MONEY TO GET MORE.: NEVER TRUE

## 2025-01-29 ASSESSMENT — PATIENT HEALTH QUESTIONNAIRE - PHQ9
SUM OF ALL RESPONSES TO PHQ9 QUESTIONS 1 & 2: 0
DEPRESSION UNABLE TO ASSESS: FUNCTIONAL CAPACITY MOTIVATION LIMITS ACCURACY
SUM OF ALL RESPONSES TO PHQ QUESTIONS 1-9: 0
1. LITTLE INTEREST OR PLEASURE IN DOING THINGS: NOT AT ALL
SUM OF ALL RESPONSES TO PHQ QUESTIONS 1-9: 0
2. FEELING DOWN, DEPRESSED OR HOPELESS: NOT AT ALL
SUM OF ALL RESPONSES TO PHQ QUESTIONS 1-9: 0
SUM OF ALL RESPONSES TO PHQ QUESTIONS 1-9: 0

## 2025-01-29 NOTE — PROGRESS NOTES
Immunizations Administered       Name Date Dose Route    Zoster Recombinant (Shingrix) 1/29/2025 0.5 mL Intramuscular    Site: Deltoid- Left    Lot: [id number]    NDC: 41042-968-81

## 2025-05-14 ENCOUNTER — OFFICE VISIT (OUTPATIENT)
Dept: FAMILY MEDICINE CLINIC | Facility: CLINIC | Age: 50
End: 2025-05-14
Payer: OTHER GOVERNMENT

## 2025-05-14 VITALS
SYSTOLIC BLOOD PRESSURE: 115 MMHG | BODY MASS INDEX: 33.13 KG/M2 | OXYGEN SATURATION: 99 % | WEIGHT: 180 LBS | DIASTOLIC BLOOD PRESSURE: 76 MMHG | HEIGHT: 62 IN | TEMPERATURE: 98.1 F | RESPIRATION RATE: 15 BRPM | HEART RATE: 68 BPM

## 2025-05-14 DIAGNOSIS — R10.84 GENERALIZED ABDOMINAL PAIN: Primary | ICD-10-CM

## 2025-05-14 DIAGNOSIS — Z12.31 ENCOUNTER FOR SCREENING MAMMOGRAM FOR MALIGNANT NEOPLASM OF BREAST: ICD-10-CM

## 2025-05-14 PROCEDURE — 99214 OFFICE O/P EST MOD 30 MIN: CPT | Performed by: STUDENT IN AN ORGANIZED HEALTH CARE EDUCATION/TRAINING PROGRAM

## 2025-05-14 ASSESSMENT — ENCOUNTER SYMPTOMS
SINUS PAIN: 0
CHEST TIGHTNESS: 0
SHORTNESS OF BREATH: 0

## 2025-05-14 NOTE — PROGRESS NOTES
[facility]    HISTORY OF PRESENT ILLNESS  Elizabeth Núñez  is a 49 y.o. y.o. female here for acute visit    Chest pain x 2 weeks  -present with deep breaths, and feels \"like something is compressing\" on R chest  -also feels \"achiness\" on the L chest  -pain is constant, unrelated to food  -pain not worse with walking or position  -feels sweaty  -taking omeprazole, feels like her acid reflux pain is different from this pain  -does have hx of constipation, has been a few days since last BM  -takes metamucil daily      HM  -needs mammogram      Mr#: [id number]      Past Medical History:   Diagnosis Date    Chronic back pain     DDD (degenerative disc disease)     Fibromyalgia     GERD (gastroesophageal reflux disease)     Headache     Obesity        Past Surgical History:   Procedure Laterality Date    JOINT REPLACEMENT  2022    L5 S1 ALIF    PARTIAL HYSTERECTOMY (CERVIX NOT REMOVED)      UPPER GASTROINTESTINAL ENDOSCOPY         Family History   Problem Relation Age of Onset    Diabetes Maternal Uncle     Stroke Maternal Grandmother     Cancer Maternal Grandfather     Cancer Paternal Grandmother     Breast Cancer Paternal Aunt     Stroke Maternal Uncle     Diabetes Father     Osteoporosis Mother     High Cholesterol Mother     Headache Mother        Allergies   Allergen Reactions    Adalimumab Rash       Social History     Tobacco Use   Smoking Status Never   Smokeless Tobacco Not on file       Social History     Substance and Sexual Activity   Alcohol Use No       Immunization History   Administered Date(s) Administered    COVID-19, PFIZER PURPLE top, DILUTE for use, (age 12 y+), 30mcg/0.3mL 12/11/2021    COVID-19, PFIZER, 2024/25, (age 12y+), IM, 30mcg/0.3mL 04/29/2020, 01/27/2021, 02/18/2021, 12/11/2021    COVID-19, [place] Vaccine, Vaccine Unspecified 02/01/2021, 03/01/2021, 12/01/2021    Hep A, HAVRIX, VAQTA, (age 19y+), IM, 1mL 06/04/2015    Hepatitis B 05/15/1998, 11/13/1998, 06/04/2015

## 2025-05-14 NOTE — PROGRESS NOTES
Elizabeth Núñez is a 49 y.o. female (: 1975) presenting to address:    Chief Complaint   Patient presents with    Chest Pain       Vitals:    25 1351   BP: 115/76   Pulse: 68   Resp: 15   Temp: 98.1 °F (36.7 °C)   SpO2: 99%       \"Have you been to the ER, urgent care clinic since your last visit?  Hospitalized since your last visit?\"    NO    “Have you seen or consulted any other health care providers outside of [facility] since your last visit?”    YES - When: approximately 2 months ago.  Where and Why: Neuro Surgeon for MRI/Back Pain.    “Have you had a colorectal cancer screening such as a colonoscopy/FIT/Cologuard?    YES - Type: Colonoscopy - Where: [facility] Nurse/CMA to request most recent records if not in the chart     No colonoscopy on file  No cologuard on file  No FIT/FOBT on file   No flexible sigmoidoscopy on file        Have you had a mammogram?”   NO    No breast cancer screening on file      “Have you had a pap smear?”    NO    No cervical cancer screening on file

## 2025-05-15 ENCOUNTER — RESULTS FOLLOW-UP (OUTPATIENT)
Dept: FAMILY MEDICINE CLINIC | Facility: CLINIC | Age: 50
End: 2025-05-15

## 2025-08-12 ENCOUNTER — OFFICE VISIT (OUTPATIENT)
Dept: FAMILY MEDICINE CLINIC | Facility: CLINIC | Age: 50
End: 2025-08-12
Payer: OTHER GOVERNMENT

## 2025-08-12 VITALS
BODY MASS INDEX: 33.58 KG/M2 | SYSTOLIC BLOOD PRESSURE: 113 MMHG | HEIGHT: 62 IN | DIASTOLIC BLOOD PRESSURE: 75 MMHG | TEMPERATURE: 98.6 F | RESPIRATION RATE: 17 BRPM | WEIGHT: 182.5 LBS | OXYGEN SATURATION: 95 % | HEART RATE: 78 BPM

## 2025-08-12 DIAGNOSIS — M99.09 SOMATIC DYSFUNCTION OF ABDOMINAL REGION: ICD-10-CM

## 2025-08-12 DIAGNOSIS — K59.00 CONSTIPATION, UNSPECIFIED CONSTIPATION TYPE: Primary | ICD-10-CM

## 2025-08-12 PROCEDURE — 98925 OSTEOPATH MANJ 1-2 REGIONS: CPT | Performed by: STUDENT IN AN ORGANIZED HEALTH CARE EDUCATION/TRAINING PROGRAM

## 2025-08-12 PROCEDURE — 99214 OFFICE O/P EST MOD 30 MIN: CPT | Performed by: STUDENT IN AN ORGANIZED HEALTH CARE EDUCATION/TRAINING PROGRAM

## 2025-08-12 RX ORDER — SODIUM PHOSPHATE, DIBASIC AND SODIUM PHOSPHATE, MONOBASIC 7; 19 G/230ML; G/230ML
3 ENEMA RECTAL
Refills: 1 | COMMUNITY
Start: 2025-08-12

## 2025-08-12 ASSESSMENT — ENCOUNTER SYMPTOMS
ABDOMINAL DISTENTION: 1
CONSTIPATION: 1
SHORTNESS OF BREATH: 0
ABDOMINAL PAIN: 1
CHEST TIGHTNESS: 0
SINUS PAIN: 0